# Patient Record
Sex: MALE | Race: WHITE | NOT HISPANIC OR LATINO | Employment: FULL TIME | URBAN - NONMETROPOLITAN AREA
[De-identification: names, ages, dates, MRNs, and addresses within clinical notes are randomized per-mention and may not be internally consistent; named-entity substitution may affect disease eponyms.]

---

## 2021-10-23 ENCOUNTER — OFFICE VISIT (OUTPATIENT)
Dept: FAMILY MEDICINE | Facility: OTHER | Age: 41
End: 2021-10-23
Attending: NURSE PRACTITIONER
Payer: COMMERCIAL

## 2021-10-23 VITALS
SYSTOLIC BLOOD PRESSURE: 158 MMHG | BODY MASS INDEX: 37.39 KG/M2 | HEIGHT: 70 IN | TEMPERATURE: 98.8 F | RESPIRATION RATE: 16 BRPM | OXYGEN SATURATION: 98 % | DIASTOLIC BLOOD PRESSURE: 92 MMHG | HEART RATE: 100 BPM | WEIGHT: 261.2 LBS

## 2021-10-23 DIAGNOSIS — J02.9 SORE THROAT: ICD-10-CM

## 2021-10-23 DIAGNOSIS — J02.9 VIRAL PHARYNGITIS: Primary | ICD-10-CM

## 2021-10-23 LAB — GROUP A STREP BY PCR: NOT DETECTED

## 2021-10-23 PROCEDURE — 87651 STREP A DNA AMP PROBE: CPT | Mod: ZL | Performed by: NURSE PRACTITIONER

## 2021-10-23 PROCEDURE — 99203 OFFICE O/P NEW LOW 30 MIN: CPT | Performed by: NURSE PRACTITIONER

## 2021-10-23 ASSESSMENT — MIFFLIN-ST. JEOR: SCORE: 2098.11

## 2021-10-23 ASSESSMENT — PAIN SCALES - GENERAL: PAINLEVEL: MILD PAIN (2)

## 2021-10-23 NOTE — PROGRESS NOTES
ASSESSMENT/PLAN:     I have reviewed the nursing notes.  I have reviewed the findings, diagnosis, plan and need for follow up with the patient.      1. Sore throat    - Group A Streptococcus PCR Throat Swab    2. Viral pharyngitis    Discussed with patient that symptoms and exam are consistent with viral illness.    Discussed with patient that exam findings are consistent with likely HFM viral illness.  No clinical indications for antibiotics.    Symptomatic treatment - Encouraged fluids, salt water gargles, honey, elevation, humidifier, lozenges, tea, soup, smoothies, popsicles,etc     May use over-the-counter Tylenol or ibuprofen PRN    Discussed warning signs/symptoms indicative of need to f/u  Follow up if symptoms persist or worsen or concerns      I explained my diagnostic considerations and recommendations to the patient, who voiced understanding and agreement with the treatment plan. All questions were answered. We discussed potential side effects of any prescribed or recommended therapies, as well as expectations for response to treatments.    Mikki Cortez NP  [facility]      SUBJECTIVE:   Napoleon Bonilla is a 41 year old male who presents to clinic today for the following health issues:  Strep test    HPI  Sore throat started 2.5 weeks ago.  Resolved a week ago then started again W3 days ago with chills and sweats. No fevers.  Fatigued and slept for 16 hours, now energy normal.   Painful to swallow initially, felt like razors for a day then resolved.  No minimal to no pain in throat.  Noted red spots on top of mouth today so wanted to get checked for strep.  Normal appetite.  No nasal drainage or congestion.  No cough or shortness of breath.  No headaches.    covid tested frequently for work, negative.    Not taking anything for symptoms.      There are no problems to display for this patient.    History reviewed. No pertinent past medical history.   History reviewed. No pertinent  "surgical history.  Social History     Tobacco Use     Smoking status: Never Smoker     Smokeless tobacco: Never Used   Substance Use Topics     Alcohol use: Not Currently     Social History     Social History Narrative     Not on file     No current outpatient medications on file.     No Known Allergies        OBJECTIVE:     BP (!) 158/92 (BP Location: Left arm, Patient Position: Sitting, Cuff Size: Adult Regular)   Pulse 100   Temp 98.8  F (37.1  C) (Tympanic)   Resp 16   Ht 1.765 m (5' 9.5\")   Wt 118.5 kg (261 lb 3.2 oz)   SpO2 98%   BMI 38.02 kg/m    Body mass index is 38.02 kg/m .     Physical Exam  General Appearance: Well appearing adult male, non ill appearance, appropriate appearance for age. No acute distress  Ears: Left TM intact, translucent with bony landmarks appreciated, no erythema, no effusion, no bulging, no purulence.  Right TM intact, translucent with bony landmarks appreciated, no erythema, no effusion, no bulging, no purulence.  Left auditory canal clear.  Right auditory canal clear.  Normal external ears, non tender.   Orophayrnx: moist mucous membranes, posterior pharynx with erythema, tonsils with 2+ hypertrophy, tonsils with erythema, and erythematous exudates, palate with petechiae, no post nasal drip seen, no trismus, voice clear.    Nose:  No noted drainage or congestion   Neck: mild cervical lymph node enlargement, non tender   Respiratory: normal chest wall and respirations.  Normal effort.  Clear to auscultation bilaterally, no wheezing, crackles or rhonchi.  No increased work of breathing.  No cough appreciated.  Cardiac: RRR with no murmurs  Musculoskeletal:  Equal movement of bilateral upper extremities.  Equal movement of bilateral lower extremities.  Normal gait.    Psychological: normal affect, alert, oriented, and pleasant.       Labs:  Results for orders placed or performed in visit on 10/23/21   Group A Streptococcus PCR Throat Swab     Status: Normal    Specimen: " Throat; Swab   Result Value Ref Range    Group A strep by PCR Not Detected Not Detected    Narrative    The Xpert Xpress Strep A test, performed on the [employer], is a rapid, qualitative in vitro diagnostic test for the detection of Streptococcus pyogenes (Group A ß-hemolytic Streptococcus, Strep A) in throat swab specimens from patients with signs and symptoms of pharyngitis. The Xpert Xpress Strep A test can be used as an aid in the diagnosis of Group A Streptococcal pharyngitis. The assay is not intended to monitor treatment for Group A Streptococcus infections. The Xpert Xpress Strep A test utilizes an automated real-time polymerase chain reaction (PCR) to detect Streptococcus pyogenes DNA.

## 2021-10-23 NOTE — NURSING NOTE
Patient presents to the clinic for on and off sore throat. Patient had a negative covid test this morning and is concerned about strep.         FOOD SECURITY SCREENING QUESTIONS  Hunger Vital Signs:  Within the past 12 months we worried whether our food would run out before we got money to buy more. Never  Within the past 12 months the food we bought just didn't last and we didn't have money to get more. Never  Medication Reconciliation: complete  Leah Glass CMA 10/23/2021 12:40 PM

## 2022-04-03 ENCOUNTER — HEALTH MAINTENANCE LETTER (OUTPATIENT)
Age: 42
End: 2022-04-03

## 2022-10-03 ENCOUNTER — HEALTH MAINTENANCE LETTER (OUTPATIENT)
Age: 42
End: 2022-10-03

## 2023-05-20 ENCOUNTER — HEALTH MAINTENANCE LETTER (OUTPATIENT)
Age: 43
End: 2023-05-20

## 2024-07-27 ENCOUNTER — HEALTH MAINTENANCE LETTER (OUTPATIENT)
Age: 44
End: 2024-07-27